# Patient Record
Sex: FEMALE | Race: WHITE | NOT HISPANIC OR LATINO | Employment: FULL TIME | URBAN - NONMETROPOLITAN AREA
[De-identification: names, ages, dates, MRNs, and addresses within clinical notes are randomized per-mention and may not be internally consistent; named-entity substitution may affect disease eponyms.]

---

## 2023-04-11 ENCOUNTER — TELEPHONE (OUTPATIENT)
Dept: PRIMARY CARE | Facility: CLINIC | Age: 48
End: 2023-04-11
Payer: COMMERCIAL

## 2023-04-11 NOTE — TELEPHONE ENCOUNTER
REFILL NEEDED FOR BUSPIRONE SENT TO [facility]. SHE'S COMPLETELY OUT    THEN SHE WOULD LIKE TO HAVE A REQUEST SENT TO HER MAIL ORDER ([facility]) FOR GOING FORWARD.

## 2023-04-14 DIAGNOSIS — F32.9 MAJOR DEPRESSIVE DISORDER WITH CURRENT ACTIVE EPISODE, UNSPECIFIED DEPRESSION EPISODE SEVERITY, UNSPECIFIED WHETHER RECURRENT: ICD-10-CM

## 2023-04-14 RX ORDER — ACYCLOVIR 400 MG/1
400 TABLET ORAL 3 TIMES DAILY
COMMUNITY
Start: 2020-12-31 | End: 2024-05-31 | Stop reason: SDUPTHER

## 2023-04-14 RX ORDER — CITALOPRAM 40 MG/1
1 TABLET, FILM COATED ORAL DAILY
COMMUNITY
End: 2023-04-17 | Stop reason: SDUPTHER

## 2023-04-14 RX ORDER — MECLIZINE HYDROCHLORIDE 25 MG/1
25 TABLET ORAL 3 TIMES DAILY PRN
COMMUNITY
Start: 2020-05-27

## 2023-04-14 RX ORDER — CLOPIDOGREL BISULFATE 75 MG/1
1 TABLET ORAL DAILY
COMMUNITY
Start: 2022-08-31 | End: 2024-05-31 | Stop reason: SDUPTHER

## 2023-04-14 RX ORDER — NITROGLYCERIN 0.4 MG/1
0.4 TABLET SUBLINGUAL EVERY 5 MIN PRN
COMMUNITY

## 2023-04-14 RX ORDER — FENOFIBRATE 48 MG/1
48 TABLET, FILM COATED ORAL DAILY
COMMUNITY
End: 2023-04-17 | Stop reason: SDUPTHER

## 2023-04-14 RX ORDER — INSULIN GLARGINE-YFGN 100 [IU]/ML
INJECTION, SOLUTION SUBCUTANEOUS
COMMUNITY
Start: 2021-08-05 | End: 2024-03-12 | Stop reason: SDUPTHER

## 2023-04-14 RX ORDER — FLUTICASONE PROPIONATE 50 MCG
1 SPRAY, SUSPENSION (ML) NASAL DAILY
COMMUNITY
End: 2024-05-31 | Stop reason: SDUPTHER

## 2023-04-14 RX ORDER — ATORVASTATIN CALCIUM 80 MG/1
1 TABLET, FILM COATED ORAL DAILY
COMMUNITY
End: 2023-04-17 | Stop reason: SDUPTHER

## 2023-04-14 RX ORDER — BUSPIRONE HYDROCHLORIDE 5 MG/1
5 TABLET ORAL 3 TIMES DAILY
COMMUNITY
Start: 2023-02-17 | End: 2023-04-14 | Stop reason: SDUPTHER

## 2023-04-14 RX ORDER — INSULIN LISPRO 100 [IU]/ML
INJECTION, SOLUTION INTRAVENOUS; SUBCUTANEOUS
COMMUNITY
End: 2024-05-31 | Stop reason: SDUPTHER

## 2023-04-14 RX ORDER — BUSPIRONE HYDROCHLORIDE 5 MG/1
5 TABLET ORAL 3 TIMES DAILY
Qty: 90 TABLET | Refills: 0 | Status: SHIPPED | OUTPATIENT
Start: 2023-04-14 | End: 2023-04-17 | Stop reason: SDUPTHER

## 2023-04-14 RX ORDER — GABAPENTIN 400 MG/1
400 CAPSULE ORAL
COMMUNITY
End: 2024-04-16 | Stop reason: SDUPTHER

## 2023-04-14 RX ORDER — FLASH GLUCOSE SENSOR
1 KIT MISCELLANEOUS
COMMUNITY
Start: 2023-02-11 | End: 2024-02-16 | Stop reason: SDUPTHER

## 2023-04-17 ENCOUNTER — OFFICE VISIT (OUTPATIENT)
Dept: PRIMARY CARE | Facility: CLINIC | Age: 48
End: 2023-04-17
Payer: COMMERCIAL

## 2023-04-17 VITALS
HEIGHT: 66 IN | BODY MASS INDEX: 24.75 KG/M2 | DIASTOLIC BLOOD PRESSURE: 78 MMHG | HEART RATE: 72 BPM | SYSTOLIC BLOOD PRESSURE: 130 MMHG | WEIGHT: 154 LBS

## 2023-04-17 DIAGNOSIS — E78.5 HYPERLIPIDEMIA, UNSPECIFIED HYPERLIPIDEMIA TYPE: ICD-10-CM

## 2023-04-17 DIAGNOSIS — N92.1 MENOMETRORRHAGIA: Primary | ICD-10-CM

## 2023-04-17 DIAGNOSIS — F32.9 MAJOR DEPRESSIVE DISORDER WITH CURRENT ACTIVE EPISODE, UNSPECIFIED DEPRESSION EPISODE SEVERITY, UNSPECIFIED WHETHER RECURRENT: ICD-10-CM

## 2023-04-17 LAB
ESTIMATED AVERAGE GLUCOSE FOR HBA1C: 200 MG/DL
HEMATOCRIT (%) IN BLOOD BY AUTOMATED COUNT: 39.3 % (ref 36–46)
HEMOGLOBIN (G/DL) IN BLOOD: 13.3 G/DL (ref 12–16)
HEMOGLOBIN A1C/HEMOGLOBIN TOTAL IN BLOOD: 8.6 %

## 2023-04-17 PROCEDURE — 1036F TOBACCO NON-USER: CPT | Performed by: STUDENT IN AN ORGANIZED HEALTH CARE EDUCATION/TRAINING PROGRAM

## 2023-04-17 PROCEDURE — 99214 OFFICE O/P EST MOD 30 MIN: CPT | Performed by: STUDENT IN AN ORGANIZED HEALTH CARE EDUCATION/TRAINING PROGRAM

## 2023-04-17 RX ORDER — ATORVASTATIN CALCIUM 80 MG/1
80 TABLET, FILM COATED ORAL DAILY
Qty: 90 TABLET | Refills: 3 | Status: SHIPPED | OUTPATIENT
Start: 2023-04-17 | End: 2024-05-31 | Stop reason: SDUPTHER

## 2023-04-17 RX ORDER — BUSPIRONE HYDROCHLORIDE 5 MG/1
5 TABLET ORAL 3 TIMES DAILY
Qty: 270 TABLET | Refills: 3 | Status: SHIPPED | OUTPATIENT
Start: 2023-04-17 | End: 2024-04-02

## 2023-04-17 RX ORDER — CITALOPRAM 40 MG/1
40 TABLET, FILM COATED ORAL DAILY
Qty: 90 TABLET | Refills: 3 | Status: SHIPPED | OUTPATIENT
Start: 2023-04-17 | End: 2024-03-25 | Stop reason: SDUPTHER

## 2023-04-17 RX ORDER — FENOFIBRATE 48 MG/1
48 TABLET, FILM COATED ORAL DAILY
Qty: 90 TABLET | Refills: 3 | Status: SHIPPED | OUTPATIENT
Start: 2023-04-17 | End: 2024-04-02

## 2023-04-17 ASSESSMENT — PATIENT HEALTH QUESTIONNAIRE - PHQ9
SUM OF ALL RESPONSES TO PHQ9 QUESTIONS 1 AND 2: 0
1. LITTLE INTEREST OR PLEASURE IN DOING THINGS: NOT AT ALL
2. FEELING DOWN, DEPRESSED OR HOPELESS: NOT AT ALL

## 2023-04-17 NOTE — PROGRESS NOTES
Subjective   Patient ID: Blanca Nye is a 47 y.o. female who presents for 2 month f/u med check .    HPI    Here for follow up.     Anxiety and depression better with Buspirone to Citalopram. No side-effects noticed. Continue current regimen.       Reports that her symptoms of hot flashes and mood swings have improved with the above treatment. However, she has been having irregular and excessive bleeding. Has hx of endometriosis per her report. She would like to establish care with gynecologist.   Plan: Menometrorrhagia - referral to gynecology is provided.    Recovering from stroke well.     Arms are 80% to her normal   Legs are almost to the baseline strength.   She has not seen a neurologist yet. Wondering if she should establish care. Encouraged her to establish care with a neurologist.  Plan: Continue current regimen for phoikysical therapy.     Currently on atorvastatin. Last Lipid panel with high Triglyceride levels.   Plan: Continue Atorvastatin. reassess now.     Diabetes mellitus: Patient's most recent A1c was 9. Discussed about management options. Patient has been trying to take her medications regularly. Noticing her blood glucose levels have been in normal ranges now. takes 48 units of Siemglee at night and humalog 10 units withmeals  Plan: continue current regimen for now. We will reassess her A1c now.      Review of Systems  ROS negative except discussed above in HPI.    Vitals:    04/17/23 0816   BP: 130/78   Pulse: 72     Objective   Physical Exam  Constitutional:       Appearance: Normal appearance.   Cardiovascular:      Rate and Rhythm: Normal rate and regular rhythm.   Pulmonary:      Effort: Pulmonary effort is normal.      Breath sounds: Normal breath sounds.   Musculoskeletal:      Cervical back: Normal range of motion and neck supple.   Neurological:      Mental Status: She is alert.           Assessment/Plan   Blanca was seen today for 2 month f/u med check .  Diagnoses and all orders for  this visit:  Menometrorrhagia (Primary)  -     Referral to Gynecology; Future  Hyperlipidemia, unspecified hyperlipidemia type  -     atorvastatin (Lipitor) 80 mg tablet; Take 1 tablet (80 mg) by mouth once daily.  -     fenofibrate (Tricor) 48 mg tablet; Take 1 tablet (48 mg) by mouth once daily.  Major depressive disorder with current active episode, unspecified depression episode severity, unspecified whether recurrent  -     citalopram (CeleXA) 40 mg tablet; Take 1 tablet (40 mg) by mouth once daily.  -     busPIRone (Buspar) 5 mg tablet; Take 1 tablet (5 mg) by mouth in the morning and 1 tablet (5 mg) in the evening and 1 tablet (5 mg) before bedtime.      Follow up in 6 months.      Gage Carrasquillo MD MPH

## 2023-06-22 DIAGNOSIS — E13.9 DIABETES MELLITUS OF OTHER TYPE WITHOUT COMPLICATION, UNSPECIFIED WHETHER LONG TERM INSULIN USE (MULTI): Primary | ICD-10-CM

## 2023-10-17 ENCOUNTER — APPOINTMENT (OUTPATIENT)
Dept: PRIMARY CARE | Facility: CLINIC | Age: 48
End: 2023-10-17
Payer: COMMERCIAL

## 2023-12-18 ENCOUNTER — OFFICE VISIT (OUTPATIENT)
Dept: PRIMARY CARE | Facility: CLINIC | Age: 48
End: 2023-12-18
Payer: COMMERCIAL

## 2023-12-18 VITALS
BODY MASS INDEX: 24.08 KG/M2 | SYSTOLIC BLOOD PRESSURE: 126 MMHG | DIASTOLIC BLOOD PRESSURE: 80 MMHG | WEIGHT: 149.8 LBS | HEIGHT: 66 IN | HEART RATE: 77 BPM

## 2023-12-18 DIAGNOSIS — L03.011 CELLULITIS OF FINGER OF RIGHT HAND: Primary | ICD-10-CM

## 2023-12-18 DIAGNOSIS — N76.0 ACUTE VAGINITIS: ICD-10-CM
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
## 2023-12-18 PROCEDURE — 99213 OFFICE O/P EST LOW 20 MIN: CPT | Performed by: NURSE PRACTITIONER

## 2023-12-18 PROCEDURE — 1036F TOBACCO NON-USER: CPT | Performed by: NURSE PRACTITIONER

## 2023-12-18 RX ORDER — FLUCONAZOLE 150 MG/1
150 TABLET ORAL ONCE
Qty: 1 TABLET | Refills: 0 | Status: SHIPPED | OUTPATIENT
Start: 2023-12-18 | End: 2023-12-18

## 2023-12-18 RX ORDER — DOXYCYCLINE 100 MG/1
100 CAPSULE ORAL 2 TIMES DAILY
Qty: 10 CAPSULE | Refills: 0 | Status: SHIPPED | OUTPATIENT
Start: 2023-12-18 | End: 2023-12-23

## 2023-12-18 ASSESSMENT — ENCOUNTER SYMPTOMS
FEVER: 0
DIARRHEA: 0
VOMITING: 0
NUMBNESS: 0
HEADACHES: 0
ACTIVITY CHANGE: 0
NAUSEA: 0
SHORTNESS OF BREATH: 0
DIZZINESS: 0
MYALGIAS: 1
WOUND: 1
APPETITE CHANGE: 0
COLOR CHANGE: 1

## 2023-12-18 NOTE — PROGRESS NOTES
"Subjective   Patient ID: Blanca Nye is a 48 y.o. female who presents for Animal Bite (Personal dog - happened Friday - right index finger).    Dog bite right hand index finger  Dog bite Friday, Saturday swelling, Sunday feeling hot with redness/painful  She reports she started taking some \"left over\" Augmentin, over the weekend  Patient was bit by her own dog, Shiatzu and yorky mix dog  Dog UTD on shots had rabies vaccine   States she is prone to yeast infection with antibiotic use, would like something to treat yeast infection          Review of Systems   Constitutional:  Negative for activity change, appetite change and fever.   Respiratory:  Negative for shortness of breath.    Cardiovascular:  Negative for chest pain.   Gastrointestinal:  Negative for diarrhea, nausea and vomiting.   Musculoskeletal:  Positive for myalgias.   Skin:  Positive for color change and wound.   Neurological:  Negative for dizziness, numbness and headaches.       Objective   /80 (Patient Position: Sitting)   Pulse 77   Ht 1.676 m (5' 6\")   Wt 67.9 kg (149 lb 12.8 oz)   BMI 24.18 kg/m²     Physical Exam  Vitals reviewed.   Constitutional:       General: She is not in acute distress.     Appearance: Normal appearance. She is not ill-appearing.   Cardiovascular:      Rate and Rhythm: Normal rate and regular rhythm.   Pulmonary:      Effort: Pulmonary effort is normal.   Musculoskeletal:      Right hand: Swelling and tenderness present.   Skin:     Findings: Wound present.      Comments: Single puncture to right 1st finger lateral side, surrounding tissue swollen with erythema, no warmth noted, no drainage noted   Neurological:      Mental Status: She is alert and oriented to person, place, and time.         Assessment/Plan   Diagnoses and all orders for this visit:  Cellulitis of finger of right hand  -     doxycycline (Vibramycin) 100 mg capsule; Take 1 capsule (100 mg) by mouth 2 times a day for 5 days. Take with at least 8 " ounces (large glass) of water, do not lie down for 30 minutes after  Monitor redness, if worsening pain, fever, or redness return to office.   Complete entire coarse of antibiotics.  Advised ONLY take antibiotics for what they are prescribed for.   Keep area clean with soap and water.   Acute vaginitis  -     fluconazole (Diflucan) 150 mg tablet; Take 1 tablet (150 mg) by mouth 1 time for 1 dose.  Dog bite, initial encounter  Follow up as needed

## 2024-02-15 ENCOUNTER — TELEPHONE (OUTPATIENT)
Dept: PRIMARY CARE | Facility: CLINIC | Age: 49
End: 2024-02-15
Payer: COMMERCIAL

## 2024-02-15 DIAGNOSIS — E13.9 DIABETES 1.5, MANAGED AS TYPE 2 (MULTI): ICD-10-CM

## 2024-02-16 RX ORDER — FLASH GLUCOSE SENSOR
1 KIT MISCELLANEOUS
Qty: 1 EACH | Refills: 3 | Status: SHIPPED | OUTPATIENT
Start: 2024-02-16 | End: 2024-05-09 | Stop reason: SDUPTHER

## 2024-03-12 ENCOUNTER — TELEPHONE (OUTPATIENT)
Dept: PRIMARY CARE | Facility: CLINIC | Age: 49
End: 2024-03-12
Payer: COMMERCIAL

## 2024-03-12 DIAGNOSIS — E13.9 DIABETES 1.5, MANAGED AS TYPE 2 (MULTI): ICD-10-CM

## 2024-03-12 RX ORDER — INSULIN GLARGINE-YFGN 100 [IU]/ML
INJECTION, SOLUTION SUBCUTANEOUS
Qty: 135 ML | Refills: 3 | Status: SHIPPED | OUTPATIENT
Start: 2024-03-12 | End: 2024-05-31 | Stop reason: SDUPTHER

## 2024-03-25 ENCOUNTER — TELEPHONE (OUTPATIENT)
Dept: PRIMARY CARE | Facility: CLINIC | Age: 49
End: 2024-03-25
Payer: COMMERCIAL

## 2024-03-25 DIAGNOSIS — F32.9 MAJOR DEPRESSIVE DISORDER WITH CURRENT ACTIVE EPISODE, UNSPECIFIED DEPRESSION EPISODE SEVERITY, UNSPECIFIED WHETHER RECURRENT: ICD-10-CM

## 2024-03-25 RX ORDER — CITALOPRAM 40 MG/1
40 TABLET, FILM COATED ORAL DAILY
Qty: 90 TABLET | Refills: 3 | Status: SHIPPED | OUTPATIENT
Start: 2024-03-25 | End: 2024-05-31 | Stop reason: SDUPTHER

## 2024-03-25 NOTE — TELEPHONE ENCOUNTER
citalopram (CeleXA) 40 mg tablet [[id number]]    Order Details  Dose: 40 mg Route: oral Frequency: Daily   Dispense Quantity: 90 tablet Refills: 3          Sig: Take 1 tablet (40 mg) by mouth once daily.         Start Date: 04/17/23 End Date: 04/16/24 after 365 doses   Written Date: 04/17/23 Rx Expiration Date: 04/16/24        Associated Diagnoses: Major depressive disorder with current active episode, unspecified depression episode severity, unspecified whether recurrent [F32.9]   [facility]. THANK YOU.

## 2024-04-01 DIAGNOSIS — F32.9 MAJOR DEPRESSIVE DISORDER WITH CURRENT ACTIVE EPISODE, UNSPECIFIED DEPRESSION EPISODE SEVERITY, UNSPECIFIED WHETHER RECURRENT: ICD-10-CM

## 2024-04-01 DIAGNOSIS — E78.5 HYPERLIPIDEMIA, UNSPECIFIED HYPERLIPIDEMIA TYPE: ICD-10-CM

## 2024-04-02 RX ORDER — BUSPIRONE HYDROCHLORIDE 5 MG/1
5 TABLET ORAL 3 TIMES DAILY
Qty: 270 TABLET | Refills: 3 | Status: SHIPPED | OUTPATIENT
Start: 2024-04-02 | End: 2024-05-31 | Stop reason: SDUPTHER

## 2024-04-02 RX ORDER — FENOFIBRATE 48 MG/1
48 TABLET, FILM COATED ORAL DAILY
Qty: 90 TABLET | Refills: 3 | Status: SHIPPED | OUTPATIENT
Start: 2024-04-02 | End: 2024-05-31 | Stop reason: SDUPTHER

## 2024-04-16 ENCOUNTER — TELEPHONE (OUTPATIENT)
Dept: PRIMARY CARE | Facility: CLINIC | Age: 49
End: 2024-04-16
Payer: COMMERCIAL

## 2024-04-16 DIAGNOSIS — E10.42 DM TYPE 1 WITH DIABETIC PERIPHERAL NEUROPATHY (MULTI): ICD-10-CM

## 2024-04-16 RX ORDER — GABAPENTIN 400 MG/1
400 CAPSULE ORAL SEE ADMIN INSTRUCTIONS
Qty: 120 CAPSULE | Refills: 0 | Status: SHIPPED | OUTPATIENT
Start: 2024-04-16 | End: 2024-05-31 | Stop reason: SDUPTHER

## 2024-04-18 DIAGNOSIS — E13.9 DIABETES 1.5, MANAGED AS TYPE 2 (MULTI): ICD-10-CM

## 2024-04-18 RX ORDER — FLASH GLUCOSE SENSOR
KIT MISCELLANEOUS
Qty: 1 EACH | Refills: 25 | OUTPATIENT
Start: 2024-04-18

## 2024-05-08 ENCOUNTER — LAB (OUTPATIENT)
Dept: LAB | Facility: LAB | Age: 49
End: 2024-05-08
Payer: COMMERCIAL

## 2024-05-08 DIAGNOSIS — E13.9 DIABETES MELLITUS OF OTHER TYPE WITHOUT COMPLICATION, UNSPECIFIED WHETHER LONG TERM INSULIN USE (MULTI): ICD-10-CM

## 2024-05-08 LAB
EST. AVERAGE GLUCOSE BLD GHB EST-MCNC: 194 MG/DL
HBA1C MFR BLD: 8.4 %

## 2024-05-08 PROCEDURE — 36415 COLL VENOUS BLD VENIPUNCTURE: CPT

## 2024-05-08 PROCEDURE — 83036 HEMOGLOBIN GLYCOSYLATED A1C: CPT

## 2024-05-09 ENCOUNTER — TELEPHONE (OUTPATIENT)
Dept: PRIMARY CARE | Facility: CLINIC | Age: 49
End: 2024-05-09
Payer: COMMERCIAL

## 2024-05-09 DIAGNOSIS — E13.9 DIABETES 1.5, MANAGED AS TYPE 2 (MULTI): ICD-10-CM

## 2024-05-09 RX ORDER — FLASH GLUCOSE SENSOR
1 KIT MISCELLANEOUS
Qty: 1 EACH | Refills: 3 | Status: SHIPPED | OUTPATIENT
Start: 2024-05-09 | End: 2024-05-31 | Stop reason: SDUPTHER

## 2024-05-31 ENCOUNTER — OFFICE VISIT (OUTPATIENT)
Dept: PRIMARY CARE | Facility: CLINIC | Age: 49
End: 2024-05-31
Payer: COMMERCIAL

## 2024-05-31 VITALS
SYSTOLIC BLOOD PRESSURE: 146 MMHG | WEIGHT: 147.7 LBS | HEART RATE: 74 BPM | OXYGEN SATURATION: 99 % | BODY MASS INDEX: 23.74 KG/M2 | DIASTOLIC BLOOD PRESSURE: 80 MMHG | HEIGHT: 66 IN

## 2024-05-31 DIAGNOSIS — E13.9 DIABETES 1.5, MANAGED AS TYPE 2 (MULTI): ICD-10-CM

## 2024-05-31 DIAGNOSIS — F32.9 MAJOR DEPRESSIVE DISORDER WITH CURRENT ACTIVE EPISODE, UNSPECIFIED DEPRESSION EPISODE SEVERITY, UNSPECIFIED WHETHER RECURRENT: ICD-10-CM

## 2024-05-31 DIAGNOSIS — B00.1 COLD SORE: ICD-10-CM

## 2024-05-31 DIAGNOSIS — E10.42 DM TYPE 1 WITH DIABETIC PERIPHERAL NEUROPATHY (MULTI): ICD-10-CM

## 2024-05-31 DIAGNOSIS — I25.10 ATHEROSCLEROSIS OF NATIVE CORONARY ARTERY, UNSPECIFIED WHETHER ANGINA PRESENT, UNSPECIFIED WHETHER NATIVE OR TRANSPLANTED HEART: ICD-10-CM

## 2024-05-31 DIAGNOSIS — E78.5 HYPERLIPIDEMIA, UNSPECIFIED HYPERLIPIDEMIA TYPE: ICD-10-CM

## 2024-05-31 DIAGNOSIS — Z00.00 ROUTINE GENERAL MEDICAL EXAMINATION AT A HEALTH CARE FACILITY: Primary | ICD-10-CM

## 2024-05-31 DIAGNOSIS — K21.9 GASTROESOPHAGEAL REFLUX DISEASE, UNSPECIFIED WHETHER ESOPHAGITIS PRESENT: ICD-10-CM

## 2024-05-31 DIAGNOSIS — J30.9 ALLERGIC RHINITIS, UNSPECIFIED SEASONALITY, UNSPECIFIED TRIGGER: ICD-10-CM

## 2024-05-31 PROCEDURE — 99396 PREV VISIT EST AGE 40-64: CPT | Performed by: STUDENT IN AN ORGANIZED HEALTH CARE EDUCATION/TRAINING PROGRAM

## 2024-05-31 PROCEDURE — 3077F SYST BP >= 140 MM HG: CPT | Performed by: STUDENT IN AN ORGANIZED HEALTH CARE EDUCATION/TRAINING PROGRAM

## 2024-05-31 PROCEDURE — 3079F DIAST BP 80-89 MM HG: CPT | Performed by: STUDENT IN AN ORGANIZED HEALTH CARE EDUCATION/TRAINING PROGRAM

## 2024-05-31 PROCEDURE — 1036F TOBACCO NON-USER: CPT | Performed by: STUDENT IN AN ORGANIZED HEALTH CARE EDUCATION/TRAINING PROGRAM

## 2024-05-31 PROCEDURE — 3052F HG A1C>EQUAL 8.0%<EQUAL 9.0%: CPT | Performed by: STUDENT IN AN ORGANIZED HEALTH CARE EDUCATION/TRAINING PROGRAM

## 2024-05-31 RX ORDER — PANTOPRAZOLE SODIUM 40 MG/1
40 TABLET, DELAYED RELEASE ORAL DAILY
Qty: 30 TABLET | Refills: 1 | Status: SHIPPED | OUTPATIENT
Start: 2024-05-31 | End: 2024-07-30

## 2024-05-31 RX ORDER — INSULIN GLARGINE-YFGN 100 [IU]/ML
INJECTION, SOLUTION SUBCUTANEOUS
Qty: 135 ML | Refills: 3 | Status: SHIPPED | OUTPATIENT
Start: 2024-05-31 | End: 2024-06-10 | Stop reason: SDUPTHER

## 2024-05-31 RX ORDER — CITALOPRAM 40 MG/1
40 TABLET, FILM COATED ORAL DAILY
Qty: 90 TABLET | Refills: 3 | Status: SHIPPED | OUTPATIENT
Start: 2024-05-31 | End: 2025-05-31

## 2024-05-31 RX ORDER — INSULIN LISPRO 100 [IU]/ML
INJECTION, SOLUTION INTRAVENOUS; SUBCUTANEOUS
Qty: 15 ML | Refills: 11 | Status: SHIPPED | OUTPATIENT
Start: 2024-05-31

## 2024-05-31 RX ORDER — CLOPIDOGREL BISULFATE 75 MG/1
75 TABLET ORAL DAILY
Qty: 90 TABLET | Refills: 3 | Status: SHIPPED | OUTPATIENT
Start: 2024-05-31 | End: 2025-05-31

## 2024-05-31 RX ORDER — GABAPENTIN 400 MG/1
400 CAPSULE ORAL SEE ADMIN INSTRUCTIONS
Qty: 370 CAPSULE | Refills: 3 | Status: SHIPPED | OUTPATIENT
Start: 2024-05-31 | End: 2025-05-31

## 2024-05-31 RX ORDER — ACYCLOVIR 400 MG/1
400 TABLET ORAL 3 TIMES DAILY
Qty: 30 TABLET | Refills: 3 | Status: SHIPPED | OUTPATIENT
Start: 2024-05-31 | End: 2024-06-10 | Stop reason: SDUPTHER

## 2024-05-31 RX ORDER — FENOFIBRATE 48 MG/1
48 TABLET, FILM COATED ORAL DAILY
Qty: 90 TABLET | Refills: 3 | Status: SHIPPED | OUTPATIENT
Start: 2024-05-31

## 2024-05-31 RX ORDER — BUSPIRONE HYDROCHLORIDE 5 MG/1
5 TABLET ORAL 3 TIMES DAILY
Qty: 270 TABLET | Refills: 3 | Status: SHIPPED | OUTPATIENT
Start: 2024-05-31

## 2024-05-31 RX ORDER — FLASH GLUCOSE SENSOR
1 KIT MISCELLANEOUS
Qty: 6 EACH | Refills: 3 | Status: SHIPPED | OUTPATIENT
Start: 2024-05-31

## 2024-05-31 RX ORDER — ATORVASTATIN CALCIUM 80 MG/1
80 TABLET, FILM COATED ORAL DAILY
Qty: 90 TABLET | Refills: 3 | Status: SHIPPED | OUTPATIENT
Start: 2024-05-31 | End: 2025-05-31

## 2024-05-31 RX ORDER — FLUTICASONE PROPIONATE 50 MCG
1 SPRAY, SUSPENSION (ML) NASAL DAILY
Qty: 16 G | Refills: 3 | Status: SHIPPED | OUTPATIENT
Start: 2024-05-31 | End: 2025-05-31

## 2024-05-31 ASSESSMENT — PATIENT HEALTH QUESTIONNAIRE - PHQ9
2. FEELING DOWN, DEPRESSED OR HOPELESS: NOT AT ALL
1. LITTLE INTEREST OR PLEASURE IN DOING THINGS: NOT AT ALL
SUM OF ALL RESPONSES TO PHQ9 QUESTIONS 1 AND 2: 0

## 2024-05-31 NOTE — PROGRESS NOTES
Subjective   Patient ID: Blanca Nye is a 48 y.o. female who presents for med check (PT is here today for a med check. Needs Colonoscopy and mammo ordered. ).    HPI    Here for follow up.     Anxiety and depression better with Buspirone to Citalopram. No side-effects noticed. Continue current regimen.     Hx of stroke.  Arms are 80% to her normal   Legs are almost to the baseline strength.   She has not seen a neurologist yet. Wondering if she should establish care. Encouraged her to establish care with a neurologist.  Plan: Continue current regimen for phoikysical therapy.     Currently on atorvastatin. Last Lipid panel with high Triglyceride levels.   Plan: Continue Atorvastatin. reassess now.     Diabetes mellitus: Patient's most recent A1c was 8.4. Discussed about management options. Patient has been trying to take her medications regularly. Noticing her blood glucose levels have been in normal ranges now. takes 50 units of Siemglee at night and humalog 10 units with meals.  Reports that she has been more careful with her diet lately.  Her vision is diminishing and diabetes is thought to be the cause.  So she is being extra careful to control her sugars well.  Plan: continue current regimen for now. We will reassess her A1c now.    Review of Systems  ROS negative except discussed above in HPI.    Vitals:    05/31/24 1106   BP: 146/80   Pulse: 74   SpO2: 99%     Objective   Physical Exam  Constitutional:       Appearance: Normal appearance.   Cardiovascular:      Rate and Rhythm: Normal rate and regular rhythm.      Pulses: Normal pulses.      Heart sounds: Normal heart sounds.   Pulmonary:      Effort: Pulmonary effort is normal.      Breath sounds: Normal breath sounds.   Neurological:      Mental Status: She is alert.           Assessment/Plan   Blanca was seen today for med check.  Diagnoses and all orders for this visit:  Routine general medical examination at a health care facility (Primary)  Diabetes 1.5,  managed as type 2 (Multi)  -     flash glucose sensor kit (FreeStyle Danisha 14 Day Sensor) kit; Inject 1 each under the skin every 14 (fourteen) days.  -     insulin glargine-yfgn (Semglee,insulin glarg-yfgn,Pen) 100 unit/mL (3 mL) Pen; Inject 50 units under the skin once daily  -     HumaLOG KwikPen Insulin 100 unit/mL injection; Inject 10-15 units sq with each meal. Pls alternate inj site. Max of 45 units per day.  Hyperlipidemia, unspecified hyperlipidemia type  -     atorvastatin (Lipitor) 80 mg tablet; Take 1 tablet (80 mg) by mouth once daily.  -     fenofibrate (Tricor) 48 mg tablet; Take 1 tablet (48 mg) by mouth once daily.  -     Lipid Panel; Future  Major depressive disorder with current active episode, unspecified depression episode severity, unspecified whether recurrent  -     busPIRone (Buspar) 5 mg tablet; Take 1 tablet (5 mg) by mouth 3 times a day.  -     citalopram (CeleXA) 40 mg tablet; Take 1 tablet (40 mg) by mouth once daily.  DM type 1 with diabetic peripheral neuropathy (Multi)  -     gabapentin (Neurontin) 400 mg capsule; Take 1 capsule (400 mg) by mouth see administration instructions. TAKE 1 CAPSULE in morning and afternoon, 2 capsules at bedtime= total 4 per day  -     Hemoglobin A1C; Future  -     Lipid Panel; Future  -     Albumin, urine, random; Future  Atherosclerosis of native coronary artery, unspecified whether angina present, unspecified whether native or transplanted heart  -     clopidogrel (Plavix) 75 mg tablet; Take 1 tablet (75 mg) by mouth once daily.  Cold sore  -     acyclovir (Zovirax) 400 mg tablet; Take 1 tablet (400 mg) by mouth 3 times a day.  Allergic rhinitis, unspecified seasonality, unspecified trigger  -     fluticasone (Flonase) 50 mcg/actuation nasal spray; Administer 1 spray into each nostril once daily.  Gastroesophageal reflux disease, unspecified whether esophagitis present  -     pantoprazole (ProtoNix) 40 mg EC tablet; Take 1 tablet (40 mg) by mouth once  daily. Do not crush, chew, or split.      Follow up in 3 months with fasting labs.          Gage Carrasquillo MD MPH

## 2024-06-06 ENCOUNTER — TELEPHONE (OUTPATIENT)
Dept: PRIMARY CARE | Facility: CLINIC | Age: 49
End: 2024-06-06
Payer: COMMERCIAL

## 2024-06-06 DIAGNOSIS — K21.9 GASTROESOPHAGEAL REFLUX DISEASE, UNSPECIFIED WHETHER ESOPHAGITIS PRESENT: ICD-10-CM

## 2024-06-06 DIAGNOSIS — B00.1 COLD SORE: ICD-10-CM

## 2024-06-06 DIAGNOSIS — E10.42 DM TYPE 1 WITH DIABETIC PERIPHERAL NEUROPATHY (MULTI): ICD-10-CM

## 2024-06-06 DIAGNOSIS — E13.9 DIABETES 1.5, MANAGED AS TYPE 2 (MULTI): ICD-10-CM

## 2024-06-06 NOTE — TELEPHONE ENCOUNTER
Needing 3 month supplies of acyclovir, humalog, pantoprazole and gabapentin sent to [facility]. Her insurance won't cover them if it's not 90 supplies. (These were recently sent her pharmacy, but they told her she needs the 90 supplies)

## 2024-06-10 RX ORDER — ACYCLOVIR 400 MG/1
400 TABLET ORAL 3 TIMES DAILY
Qty: 30 TABLET | Refills: 3 | Status: SHIPPED | OUTPATIENT
Start: 2024-06-10

## 2024-06-10 RX ORDER — INSULIN GLARGINE-YFGN 100 [IU]/ML
INJECTION, SOLUTION SUBCUTANEOUS
Qty: 135 ML | Refills: 3 | Status: SHIPPED | OUTPATIENT
Start: 2024-06-10

## 2024-06-13 ENCOUNTER — TELEPHONE (OUTPATIENT)
Dept: PRIMARY CARE | Facility: CLINIC | Age: 49
End: 2024-06-13
Payer: COMMERCIAL

## 2024-06-13 DIAGNOSIS — E10.42 DM TYPE 1 WITH DIABETIC PERIPHERAL NEUROPATHY (MULTI): ICD-10-CM

## 2024-06-13 NOTE — TELEPHONE ENCOUNTER
Patient called in and stated that her insurance company isnt covering her gabapentin since its over 1200mg a day. She was wondering if you could send in a script for the 3 pills daily instead of 4 that way she can stay under the 1200mg daily.    Please advise

## 2024-06-14 DIAGNOSIS — E10.42 DM TYPE 1 WITH DIABETIC PERIPHERAL NEUROPATHY (MULTI): ICD-10-CM

## 2024-06-14 RX ORDER — GABAPENTIN 300 MG/1
300 CAPSULE ORAL SEE ADMIN INSTRUCTIONS
Qty: 370 CAPSULE | Refills: 3 | Status: SHIPPED | OUTPATIENT
Start: 2024-06-14 | End: 2024-06-14 | Stop reason: SDUPTHER

## 2024-06-14 RX ORDER — GABAPENTIN 300 MG/1
300 CAPSULE ORAL SEE ADMIN INSTRUCTIONS
Qty: 370 CAPSULE | Refills: 3 | Status: SHIPPED | OUTPATIENT
Start: 2024-06-14 | End: 2025-06-14

## 2024-06-20 ENCOUNTER — TELEPHONE (OUTPATIENT)
Dept: PRIMARY CARE | Facility: CLINIC | Age: 49
End: 2024-06-20
Payer: COMMERCIAL

## 2024-06-20 NOTE — TELEPHONE ENCOUNTER
Patient called in and was wondering if she could have a refill on her gabapentin 400mg called into [facility] pharmacy for a 30 day supply.    Thank you

## 2024-06-21 ENCOUNTER — TELEPHONE (OUTPATIENT)
Dept: PRIMARY CARE | Facility: CLINIC | Age: 49
End: 2024-06-21
Payer: COMMERCIAL

## 2024-06-21 NOTE — TELEPHONE ENCOUNTER
Spoke to pt and let her know that RX was approved from [facility]. Expressed an understanding and nothing else needed at this time.

## 2024-06-24 ENCOUNTER — TELEPHONE (OUTPATIENT)
Dept: PRIMARY CARE | Facility: CLINIC | Age: 49
End: 2024-06-24
Payer: COMMERCIAL

## 2024-06-24 NOTE — TELEPHONE ENCOUNTER
# [id number]  Two scripts for Gabapentin in differing strenghts with same instructions, please call back to clarify which strength & invoice# [id number]

## 2024-06-24 NOTE — TELEPHONE ENCOUNTER
Spoke to Beatriz and Dr. Carrasquillo and he wanted the 300 mg sent out due to insurance purposes. Pharmacist Beatriz confirmed that the 300mg was sent to pt on 06/21/24 and she should be receiving this any day if she had not already.

## 2024-06-25 ENCOUNTER — TELEPHONE (OUTPATIENT)
Dept: PRIMARY CARE | Facility: CLINIC | Age: 49
End: 2024-06-25
Payer: COMMERCIAL

## 2024-06-25 DIAGNOSIS — E10.42 DM TYPE 1 WITH DIABETIC PERIPHERAL NEUROPATHY (MULTI): ICD-10-CM

## 2024-06-25 RX ORDER — GABAPENTIN 300 MG/1
300 CAPSULE ORAL SEE ADMIN INSTRUCTIONS
Qty: 8 CAPSULE | Refills: 0 | Status: SHIPPED | OUTPATIENT
Start: 2024-06-25 | End: 2025-06-25

## 2024-06-25 NOTE — TELEPHONE ENCOUNTER
Refill req for Gabapentin 400mg to [facility] just enough to get her through till her [facility] goes through

## 2024-07-24 ENCOUNTER — TELEPHONE (OUTPATIENT)
Dept: PRIMARY CARE | Facility: CLINIC | Age: 49
End: 2024-07-24
Payer: COMMERCIAL

## 2024-07-24 NOTE — TELEPHONE ENCOUNTER
Would like the Vonnie 2 sensor, instead of the al she is getting now  Pharmacy    [facility] - [place], [place] - [address] [place] [id number]  Phone: [phone]  Fax: [phone]

## 2024-08-13 ENCOUNTER — OFFICE VISIT (OUTPATIENT)
Dept: URGENT CARE | Facility: CLINIC | Age: 49
End: 2024-08-13
Payer: COMMERCIAL

## 2024-08-13 ENCOUNTER — HOSPITAL ENCOUNTER (OUTPATIENT)
Dept: RADIOLOGY | Facility: HOSPITAL | Age: 49
Discharge: HOME | End: 2024-08-13
Payer: COMMERCIAL

## 2024-08-13 VITALS
RESPIRATION RATE: 18 BRPM | SYSTOLIC BLOOD PRESSURE: 182 MMHG | BODY MASS INDEX: 24.11 KG/M2 | WEIGHT: 150 LBS | TEMPERATURE: 98 F | HEART RATE: 72 BPM | HEIGHT: 66 IN | DIASTOLIC BLOOD PRESSURE: 77 MMHG | OXYGEN SATURATION: 100 %

## 2024-08-13 DIAGNOSIS — M79.672 ACUTE FOOT PAIN, LEFT: Primary | ICD-10-CM

## 2024-08-13 DIAGNOSIS — Z86.69 H/O PERIPHERAL NEUROPATHY: ICD-10-CM

## 2024-08-13 DIAGNOSIS — R03.0 ELEVATED BLOOD PRESSURE READING: ICD-10-CM

## 2024-08-13 DIAGNOSIS — M79.672 ACUTE FOOT PAIN, LEFT: ICD-10-CM

## 2024-08-13 PROCEDURE — 99212 OFFICE O/P EST SF 10 MIN: CPT | Performed by: PHYSICIAN ASSISTANT

## 2024-08-13 PROCEDURE — 73630 X-RAY EXAM OF FOOT: CPT | Mod: LEFT SIDE | Performed by: RADIOLOGY

## 2024-08-13 PROCEDURE — 73630 X-RAY EXAM OF FOOT: CPT | Mod: LT

## 2024-08-13 NOTE — PROGRESS NOTES
[facility]   MARQUITA NOTE:      Name: Blanca Nye, 49 y.o.    CSN:[id number]   MRN:[id number]    PCP: Gage Carrasquillo MD MPH    ALL:    Allergies   Allergen Reactions    Metoprolol Other     Dizziness    Iodine Rash    Povidone-Iodine Rash       History:    Chief Complaint: Foot Pain (Foot pain and discoloration X 2 weeks, injury of impact from a hair dryer and excessive walking )    Encounter Date: 8/13/2024      HPI: The history was obtained from the patient. Blanca is a 49 y.o. female, who presents with a chief complaint of Foot Pain (Foot pain and discoloration X 2 weeks, injury of impact from a hair dryer and excessive walking ) the pain is tolerable, but worse with forefoot WB, she does have a h/o peripheral neuropathy & is concerned she might have broken something in her foot.     PMHx:    No past medical history on file.           Current Outpatient Medications   Medication Sig Dispense Refill    atorvastatin (Lipitor) 80 mg tablet Take 1 tablet (80 mg) by mouth once daily. 90 tablet 3    busPIRone (Buspar) 5 mg tablet Take 1 tablet (5 mg) by mouth 3 times a day. 270 tablet 3    citalopram (CeleXA) 40 mg tablet Take 1 tablet (40 mg) by mouth once daily. 90 tablet 3    clopidogrel (Plavix) 75 mg tablet Take 1 tablet (75 mg) by mouth once daily. 90 tablet 3    fenofibrate (Tricor) 48 mg tablet Take 1 tablet (48 mg) by mouth once daily. 90 tablet 3    flash glucose sensor kit (FreeStyle Danisha 14 Day Sensor) kit Inject 1 each under the skin every 14 (fourteen) days. 6 each 3    fluticasone (Flonase) 50 mcg/actuation nasal spray Administer 1 spray into each nostril once daily. 16 g 3    gabapentin (Neurontin) 300 mg capsule Take 1 capsule (300 mg) by mouth see administration instructions. TAKE 1 CAPSULE in morning and afternoon, 2 capsules at bedtime= total 4 per day 8 capsule 0    HumaLOG KwikPen Insulin 100 unit/mL injection Inject 10-15 units sq with each meal. Pls alternate inj  site. Max of 45 units per day. 15 mL 11    insulin glargine-yfgn (Semglee,insulin glarg-yfgn,Pen) 100 unit/mL (3 mL) Pen Inject 50 units under the skin once daily 135 mL 3    meclizine (Antivert) 25 mg tablet Take 1 tablet (25 mg) by mouth 3 times a day as needed for dizziness.      nitroglycerin (Nitrostat) 0.4 mg SL tablet Place 1 tablet (0.4 mg) under the tongue every 5 minutes if needed for chest pain.      acyclovir (Zovirax) 400 mg tablet Take 1 tablet (400 mg) by mouth 3 times a day. (Patient not taking: Reported on 8/13/2024) 30 tablet 3    pantoprazole (ProtoNix) 40 mg EC tablet Take 1 tablet (40 mg) by mouth once daily. Do not crush, chew, or split. 30 tablet 1     No current facility-administered medications for this visit.         PMSx:    Past Surgical History:   Procedure Laterality Date    CT ANGIO NECK  9/2/2022    CT NECK ANGIO W AND WO IV CONTRAST 9/2/2022 [facility] CLINICAL LEGACY    CT HEAD ANGIO W AND WO IV CONTRAST  9/2/2022    CT HEAD ANGIO W AND WO IV CONTRAST 9/2/2022 [facility] CLINICAL LEGACY    OTHER SURGICAL HISTORY  10/29/2019    Loop electrosurgical excision procedure    OTHER SURGICAL HISTORY  08/25/2022    Cardiac catheterization with stent placement       Fam Hx:   Family History   Problem Relation Name Age of Onset    Heart attack Father         SOC. Hx:     Social History     Socioeconomic History    Marital status:      Spouse name: Not on file    Number of children: Not on file    Years of education: Not on file    Highest education level: Not on file   Occupational History    Not on file   Tobacco Use    Smoking status: Former     Types: Cigarettes    Smokeless tobacco: Never   Vaping Use    Vaping status: Never Used   Substance and Sexual Activity    Alcohol use: Not Currently    Drug use: Never    Sexual activity: Not on file   Other Topics Concern    Not on file   Social History Narrative    Not on file     Social Determinants of Health     Financial Resource Strain: Not on file    Food Insecurity: Not on file   Transportation Needs: Not on file   Physical Activity: Not on file   Stress: Not on file   Social Connections: Not on file   Intimate Partner Violence: Not on file   Housing Stability: Not on file         Vitals:    08/13/24 1726   BP: (!) 182/77   Pulse: 72   Resp: 18   Temp: 36.7 °C (98 °F)   SpO2: 100%     68 kg (150 lb)          Physical Exam  Vitals reviewed.   Constitutional:       Appearance: She is normal weight.   HENT:      Head: Normocephalic and atraumatic.   Eyes:      Extraocular Movements: Extraocular movements intact.      Pupils: Pupils are equal, round, and reactive to light.   Cardiovascular:      Pulses: Normal pulses.   Pulmonary:      Effort: Pulmonary effort is normal.   Musculoskeletal:        Feet:    Skin:     General: Skin is warm.   Neurological:      Mental Status: She is alert.         LABORATORY @ RADIOLOGICAL IMAGING (if done):     ORDERING CLINICIAN:  NORMAN DELANEY      FINDINGS:  Attention to the 5th metatarsal head and metatarsophalangeal joint  demonstrates no specific abnormality.      No evidence of left foot fracture. Mild soft tissue swelling.      IMPRESSION:  Mild left foot soft tissue swelling. No evidence of fracture.      Signed by: Sami Pritchard 8/13/2024 6:44 PM  Dictation workstation:   [id number]    reviewed    ____________________________________________________________________    I did personally review Blanca's past medical history, surgical history, social history, as well as family history (when relevant).  In this case, I also oversaw the her drug management by reviewing her medication list, allergy list, as well as the medications that I prescribed during the UC course and/or recommended as an out-patient (including possible OTC medications such as acetaminophen, NSAIDs , etc).    After reviewing the items above, I did look at previous medical documentation, such as recent hospitalizations, office visits, and/or recent  consultations with PCP/specialist.                          SDOH:   Another factor that I considered in Blanca's care was her Social Determinants of Health (SDOH). During this  encounter, she did not have social determinants of health. Those SDOH influencing Blanca's care are: none      _____________________________________________________________________       COURSE/MEDICAL DECISION MAKING:    Blanca is a 49 y.o., who presents with a working diagnosis of   1. Acute foot pain, left    2. H/O peripheral neuropathy    3. Elevated blood pressure reading     with a differential to include: Sprain, strain, contusion, fracture, avulsion fracture, dislocation, tendinitis, tenosynovitis    No acute fracture, images reviewed with patient, short walking boot provided x1.5 weeks, request supportive care with ice, ROM AT   3.   She had an elevated reading and is diabetic, I've reviewed her past BP recordings, they've been <140/90 and she's encouraged to record them at home until her next GP visit.        Fish Self PA-C   Advanced Practice Provider  [facility]

## 2024-08-15 NOTE — PROGRESS NOTES
[facility]  Cardiology Clinic Visit Note    This is a 49 y.o. female former smoker with a history of CAD, poorly controlled DM, and HLD who presents to the clinic for follow up.     She presented to the  Urgent Care center here in [place] on 8/13/2024 with right foot pain. It was noted that her blood pressure to markedly elevated reading 182/77. She is not on any blood pressure treatment.     Today she endorses occasional chest tightness that occurs at random but not associated with any physical exertion or relieved by rest.  She has had 3-4 instances of chest pressure in the past year and has not taken any sublingual nitroglycerin. She denies shortness of breath, palpitations, leg edema, fever, chills, orthopnea, paroxysmal nocturnal dyspnea or syncope.       Active Issues  Coronary Artery Disease  Hyperlipidemia  -S/P PCI to prox LAD for ISR and progressive native artery disease, treated with ORION x1 on 8/31/2022. Previous PCI in 2008.   -Echo 8/2022 showed mildly reduced LV systolic function with LVEF 45%  -GDMT includes Plavix, atorvastatin 80 mg and fenofibrate. Jardiance 10 mg was added at her last cardiology visit.  -EKG in the office today shows NSR with no acute or chronic ischemic changes.     Hypertension  -Blood pressure in the office today 130/72.  -She does not have a cuff at home    Past Medical History  History reviewed. No pertinent past medical history.    Past Surgical History  Past Surgical History:   Procedure Laterality Date    CT ANGIO NECK  9/2/2022    CT NECK ANGIO W AND WO IV CONTRAST 9/2/2022 [facility]    CT HEAD ANGIO W AND WO IV CONTRAST  9/2/2022    CT HEAD ANGIO W AND WO IV CONTRAST 9/2/2022 [facility]ICAL [facility]    OTHER SURGICAL HISTORY  10/29/2019    Loop electrosurgical excision procedure    OTHER SURGICAL HISTORY  08/25/2022    Cardiac catheterization with stent placement       Medications  Current Outpatient Medications   Medication Instructions     acyclovir (ZOVIRAX) 400 mg, oral, 3 times daily    atorvastatin (LIPITOR) 80 mg, oral, Daily    busPIRone (BUSPAR) 5 mg, oral, 3 times daily    citalopram (CELEXA) 40 mg, oral, Daily    clopidogrel (PLAVIX) 75 mg, oral, Daily    fenofibrate (TRICOR) 48 mg, oral, Daily    flash glucose sensor kit (FreeStyle Danisha 14 Day Sensor) kit 1 each, subcutaneous, Every 14 days    fluticasone (Flonase) 50 mcg/actuation nasal spray 1 spray, Each Nostril, Daily    gabapentin (NEURONTIN) 300 mg, oral, See admin instructions, TAKE 1 CAPSULE in morning and afternoon, 2 capsules at bedtime= total 4 per day    HumaLOG KwikPen Insulin 100 unit/mL injection Inject 10-15 units sq with each meal. Pls alternate inj site. Max of 45 units per day.    insulin glargine-yfgn (Semglee,insulin glarg-yfgn,Pen) 100 unit/mL (3 mL) Pen Inject 50 units under the skin once daily    meclizine (ANTIVERT) 25 mg, oral, 3 times daily PRN    nitroglycerin (NITROSTAT) 0.4 mg, sublingual, Every 5 min PRN    pantoprazole (PROTONIX) 40 mg, oral, Daily, Do not crush, chew, or split.       Allergies  Allergies   Allergen Reactions    Iodine Rash    Metoprolol Other     Dizziness    Povidone-Iodine Rash       Social History  Social History     Tobacco Use    Smoking status: Former     Types: Cigarettes    Smokeless tobacco: Never   Vaping Use    Vaping status: Never Used   Substance Use Topics    Alcohol use: Not Currently    Drug use: Never       Family History  Family History   Problem Relation Name Age of Onset    Heart attack Father         VITALS  Vitals:    08/19/24 0859   BP: 130/72   Pulse: 70   SpO2: 100%       Weight  Vitals:    08/19/24 0859   Weight: 66.2 kg (145 lb 14.4 oz)       PHYSICAL EXAM  Physical Exam  Vitals and nursing note reviewed.   Constitutional:       General: She is not in acute distress.  HENT:      Head: Normocephalic and atraumatic.      Mouth/Throat:      Mouth: Mucous membranes are moist.      Pharynx: Oropharynx is clear.   Eyes:       General: No scleral icterus.     Pupils: Pupils are equal, round, and reactive to light.   Cardiovascular:      Rate and Rhythm: Normal rate and regular rhythm.      Pulses: Normal pulses.      Heart sounds: Normal heart sounds, S1 normal and S2 normal. No murmur heard.     No friction rub.   Pulmonary:      Effort: Pulmonary effort is normal.      Breath sounds: Normal breath sounds.   Abdominal:      General: Bowel sounds are normal. There is no distension.      Palpations: Abdomen is soft.      Tenderness: There is no abdominal tenderness.   Musculoskeletal:         General: No swelling. Normal range of motion.      Cervical back: Normal range of motion and neck supple.      Right lower leg: No edema.      Left lower leg: No edema.   Skin:     General: Skin is warm and dry.      Capillary Refill: Capillary refill takes less than 2 seconds.      Findings: No rash.   Neurological:      General: No focal deficit present.      Mental Status: She is alert.   Psychiatric:         Mood and Affect: Mood normal.         Behavior: Behavior normal.         Cardiovascular Labs  Lab Results   Component Value Date    HGB 13.3 04/17/2023    HGB 12.6 09/03/2022    HGB 13.1 09/01/2022     09/03/2022    WBC 6.0 09/03/2022     09/03/2022    K 3.5 09/03/2022    CREATININE 0.69 09/03/2022    CREATININE 0.82 09/01/2022    CREATININE 0.85 08/30/2022    BUN 8 09/03/2022    CALCIUM 9.1 09/03/2022    INR 0.9 09/01/2022    LDLF - 09/01/2022       Assessment and Plan  Her symptoms are consistent with stable ischemic coronary disease.  Since it has been almost 2 years since her PCI I will stop her Plavix and have her continue with just aspirin.  We have not gotten an LDL value on her for years due to her extreme hypertriglyceridemia so I will order a direct LDL.  If her LDL is not at goal we will submit prior authorization for PCSK9 inhibitor.  Her diabetes is poorly controlled with A1c of 8.3 so I recommend her to  follow-up with Dr. Galeana for management of her diabetes.  Her elevated blood pressure at the urgent care center may have been secondary to her acute pain however I did encourage her to obtain a blood pressure cuff and to check it at home to 3 times a week and to either call the office with the numbers or to present them to Dr. Galeana at her follow-up next month.      Return to Care:  1 year.  Will call with results of direct LDL.    If your symptoms worsen or progress please go directory to your nearest emergency department for evaluation.     Thank you for this interesting clinical case and allowing me to participate in the care of this patient. Please reach me out if you have any questions or if you need any clarifications regarding this patient's care.    **Disclaimer: This note was dictated by speech recognition, and every effort has been made to prevent any error in transcription, however minor errors may be present**  ___________________________________________________  Preston Luque, MSN, CNP, ACNPC, CCRN  Division of Cardiovascular Medicine  [facility]  [facility]

## 2024-08-16 ENCOUNTER — OFFICE VISIT (OUTPATIENT)
Dept: PRIMARY CARE | Facility: CLINIC | Age: 49
End: 2024-08-16
Payer: COMMERCIAL

## 2024-08-16 VITALS
HEART RATE: 84 BPM | SYSTOLIC BLOOD PRESSURE: 134 MMHG | BODY MASS INDEX: 23.45 KG/M2 | OXYGEN SATURATION: 98 % | WEIGHT: 145.9 LBS | DIASTOLIC BLOOD PRESSURE: 68 MMHG | HEIGHT: 66 IN

## 2024-08-16 DIAGNOSIS — Z86.39 HX OF HYPOGLYCEMIA: Primary | ICD-10-CM

## 2024-08-16 DIAGNOSIS — E10.42 DM TYPE 1 WITH DIABETIC PERIPHERAL NEUROPATHY (MULTI): ICD-10-CM

## 2024-08-16 DIAGNOSIS — L98.9 FACE LESION: ICD-10-CM

## 2024-08-16 PROCEDURE — 3052F HG A1C>EQUAL 8.0%<EQUAL 9.0%: CPT | Performed by: STUDENT IN AN ORGANIZED HEALTH CARE EDUCATION/TRAINING PROGRAM

## 2024-08-16 PROCEDURE — 1036F TOBACCO NON-USER: CPT | Performed by: STUDENT IN AN ORGANIZED HEALTH CARE EDUCATION/TRAINING PROGRAM

## 2024-08-16 PROCEDURE — 3075F SYST BP GE 130 - 139MM HG: CPT | Performed by: STUDENT IN AN ORGANIZED HEALTH CARE EDUCATION/TRAINING PROGRAM

## 2024-08-16 PROCEDURE — 99213 OFFICE O/P EST LOW 20 MIN: CPT | Performed by: STUDENT IN AN ORGANIZED HEALTH CARE EDUCATION/TRAINING PROGRAM

## 2024-08-16 PROCEDURE — 3008F BODY MASS INDEX DOCD: CPT | Performed by: STUDENT IN AN ORGANIZED HEALTH CARE EDUCATION/TRAINING PROGRAM

## 2024-08-16 PROCEDURE — 3078F DIAST BP <80 MM HG: CPT | Performed by: STUDENT IN AN ORGANIZED HEALTH CARE EDUCATION/TRAINING PROGRAM

## 2024-08-16 RX ORDER — BLOOD-GLUCOSE,RECEIVER,CONT
EACH MISCELLANEOUS
Qty: 1 EACH | Refills: 0 | Status: SHIPPED | OUTPATIENT
Start: 2024-08-16 | End: 2024-08-16 | Stop reason: ALTCHOICE

## 2024-08-16 RX ORDER — BLOOD-GLUCOSE SENSOR
EACH MISCELLANEOUS
Qty: 6 EACH | Refills: 3 | Status: SHIPPED | OUTPATIENT
Start: 2024-08-16

## 2024-08-16 RX ORDER — BLOOD-GLUCOSE SENSOR
EACH MISCELLANEOUS
Qty: 4 EACH | Refills: 1 | Status: SHIPPED | OUTPATIENT
Start: 2024-08-16 | End: 2024-08-16

## 2024-08-16 NOTE — PROGRESS NOTES
Subjective   Patient ID: Blanca Nye is a 49 y.o. female who presents for Mole on face .    HPI    PT is here today for a couple spots on her face that are burning and itching. Unsure since when she has had these lesions now - may be more than 6 months ago. Reports this has been an on and off thing. Denies trying lotions or creams for this. Reports the spot are on the left side below the cheek bone.   Appears benign lesions. If changing in character can consider derm referral. Spending a lot of time in sun without sunscreen. Encouraged her to use sun screen.     Has type 1 DM. Patient is currently using insulin.   Has hypoglycemic events intermittently.   Uses freestyle danisha first generation which does not provide alerts with hypoglycemia.   Ordering latest generation to get alerts with hypoglycemia.     Review of Systems  ROS negative except discussed above in HPI.    Vitals:    08/16/24 0749   BP: 134/68   Pulse: 84   SpO2: 98%     Objective   Physical Exam  Constitutional:       Appearance: Normal appearance.   Skin:     Comments: Small papular lesions noticed which are skin colored without pigmentation noticed on the cheeks.    Neurological:      Mental Status: She is alert.           Assessment/Plan   Blanca was seen today for mole on face.  Diagnoses and all orders for this visit:  Hx of hypoglycemia (Primary)  -     Discontinue: FreeStyle Danisha 3 Sensor device; Check blood glucose 3-4 times a day.  -     Discontinue: FreeStyle Danisha 3 [place] misc; Use as instructed  -     FreeStyle Danisha 3 Sensor device; Check blood glucose 3-4 times a day.  DM type 1 with diabetic peripheral neuropathy (Multi)  -     Discontinue: FreeStyle Danisha 3 Sensor device; Check blood glucose 3-4 times a day.  -     Discontinue: FreeStyle Danisha 3 [place] misc; Use as instructed  -     FreeStyle Danisha 3 Sensor device; Check blood glucose 3-4 times a day.  Face lesion      Follow up as needed.     Discussed with patient that I will be  leaving [facility] at the end of August. Discussed options to transfer care.            Gage Carrasquillo MD MPH

## 2024-08-19 ENCOUNTER — APPOINTMENT (OUTPATIENT)
Dept: CARDIOLOGY | Facility: CLINIC | Age: 49
End: 2024-08-19
Payer: COMMERCIAL

## 2024-08-19 ENCOUNTER — TELEPHONE (OUTPATIENT)
Dept: CARDIOLOGY | Facility: CLINIC | Age: 49
End: 2024-08-19

## 2024-08-19 VITALS
OXYGEN SATURATION: 100 % | HEIGHT: 66 IN | DIASTOLIC BLOOD PRESSURE: 72 MMHG | SYSTOLIC BLOOD PRESSURE: 130 MMHG | WEIGHT: 145.9 LBS | BODY MASS INDEX: 23.45 KG/M2 | HEART RATE: 70 BPM

## 2024-08-19 DIAGNOSIS — I25.118 CORONARY ARTERY DISEASE OF NATIVE ARTERY OF NATIVE HEART WITH STABLE ANGINA PECTORIS (CMS-HCC): Primary | ICD-10-CM

## 2024-08-19 DIAGNOSIS — E78.2 MIXED HYPERLIPIDEMIA: ICD-10-CM

## 2024-08-19 PROBLEM — I25.10 CORONARY ARTERY DISEASE INVOLVING NATIVE CORONARY ARTERY OF NATIVE HEART: Status: ACTIVE | Noted: 2024-08-19

## 2024-08-19 PROBLEM — I10 PRIMARY HYPERTENSION: Status: ACTIVE | Noted: 2024-08-19

## 2024-08-19 PROCEDURE — 3078F DIAST BP <80 MM HG: CPT

## 2024-08-19 PROCEDURE — 93000 ELECTROCARDIOGRAM COMPLETE: CPT

## 2024-08-19 PROCEDURE — 1036F TOBACCO NON-USER: CPT

## 2024-08-19 PROCEDURE — 3008F BODY MASS INDEX DOCD: CPT

## 2024-08-19 PROCEDURE — 99214 OFFICE O/P EST MOD 30 MIN: CPT

## 2024-08-19 PROCEDURE — 3075F SYST BP GE 130 - 139MM HG: CPT

## 2024-08-19 RX ORDER — ASPIRIN 81 MG/1
81 TABLET ORAL DAILY
Qty: 90 TABLET | Refills: 3 | Status: SHIPPED | OUTPATIENT
Start: 2024-08-19 | End: 2025-08-19

## 2024-08-19 NOTE — TELEPHONE ENCOUNTER
Plavix discontinued by Preston Luque d/t therapy completed and will continue with ASA 81mg one tab po daily.

## 2024-08-21 ENCOUNTER — TELEPHONE (OUTPATIENT)
Dept: PRIMARY CARE | Facility: CLINIC | Age: 49
End: 2024-08-21
Payer: COMMERCIAL

## 2024-08-21 DIAGNOSIS — E10.42 DM TYPE 1 WITH DIABETIC PERIPHERAL NEUROPATHY (MULTI): ICD-10-CM

## 2024-08-21 RX ORDER — GABAPENTIN 300 MG/1
300 CAPSULE ORAL SEE ADMIN INSTRUCTIONS
Qty: 360 CAPSULE | Refills: 3 | Status: SHIPPED | OUTPATIENT
Start: 2024-08-21 | End: 2025-08-21

## 2024-08-21 NOTE — TELEPHONE ENCOUNTER
gabapentin (Neurontin) 300 mg capsule [[id number]]    Order Details  Dose: 300 mg Route: oral Frequency: See admin instructions   Dispense Quantity: 8 capsule Refills: 0          Sig: Take 1 capsule (300 mg) by mouth see administration instructions. TAKE 1 CAPSULE in morning and afternoon, 2 capsules at bedtime= total 4 per day   300  TOTAL PILLS FOR 3 MTHS TO [facility]. THANK YOU.

## 2024-08-26 ENCOUNTER — APPOINTMENT (OUTPATIENT)
Dept: RADIOLOGY | Facility: HOSPITAL | Age: 49
End: 2024-08-26
Payer: COMMERCIAL

## 2024-08-26 DIAGNOSIS — Z12.31 SCREENING MAMMOGRAM FOR BREAST CANCER: ICD-10-CM

## 2024-09-04 ENCOUNTER — APPOINTMENT (OUTPATIENT)
Dept: PRIMARY CARE | Facility: CLINIC | Age: 49
End: 2024-09-04
Payer: COMMERCIAL

## 2024-09-16 DIAGNOSIS — E13.9 DIABETES 1.5, MANAGED AS TYPE 2 (MULTI): ICD-10-CM

## 2024-09-16 RX ORDER — FLASH GLUCOSE SENSOR
1 KIT MISCELLANEOUS
Qty: 6 EACH | Refills: 3 | Status: SHIPPED | OUTPATIENT
Start: 2024-09-16

## 2024-09-16 NOTE — TELEPHONE ENCOUNTER
Needs a new script for freestyle al 3 sensors    [facility]   [id number]  [place], [place]     Fax: [phone]    90 day supply

## 2024-09-16 NOTE — TELEPHONE ENCOUNTER
Needs a new script for freestyle al 3 sensors to [facility] for a 3 month supply faxed to [phone].

## 2024-10-08 ENCOUNTER — APPOINTMENT (OUTPATIENT)
Dept: RADIOLOGY | Facility: HOSPITAL | Age: 49
End: 2024-10-08
Payer: COMMERCIAL

## 2024-10-09 ENCOUNTER — LAB (OUTPATIENT)
Dept: LAB | Facility: LAB | Age: 49
End: 2024-10-09
Payer: COMMERCIAL

## 2024-10-09 DIAGNOSIS — E78.2 MIXED HYPERLIPIDEMIA: ICD-10-CM

## 2024-10-09 DIAGNOSIS — E10.42 DM TYPE 1 WITH DIABETIC PERIPHERAL NEUROPATHY: ICD-10-CM

## 2024-10-09 DIAGNOSIS — I25.118 CORONARY ARTERY DISEASE OF NATIVE ARTERY OF NATIVE HEART WITH STABLE ANGINA PECTORIS: ICD-10-CM

## 2024-10-09 DIAGNOSIS — E78.5 HYPERLIPIDEMIA, UNSPECIFIED HYPERLIPIDEMIA TYPE: ICD-10-CM

## 2024-10-09 LAB
CHOLEST SERPL-MCNC: 134 MG/DL (ref 0–199)
CHOLESTEROL/HDL RATIO: 3.4
CREAT UR-MCNC: 56.4 MG/DL (ref 20–320)
EST. AVERAGE GLUCOSE BLD GHB EST-MCNC: 186 MG/DL
HBA1C MFR BLD: 8.1 %
HDLC SERPL-MCNC: 40 MG/DL
LDLC SERPL CALC-MCNC: 61 MG/DL
LDLC SERPL DIRECT ASSAY-MCNC: 71 MG/DL (ref 0–129)
MICROALBUMIN UR-MCNC: <7 MG/L
MICROALBUMIN/CREAT UR: NORMAL MG/G{CREAT}
NON HDL CHOLESTEROL: 94 MG/DL (ref 0–149)
TRIGL SERPL-MCNC: 165 MG/DL (ref 0–149)
VLDL: 33 MG/DL (ref 0–40)

## 2024-10-09 PROCEDURE — 82570 ASSAY OF URINE CREATININE: CPT

## 2024-10-09 PROCEDURE — 80061 LIPID PANEL: CPT

## 2024-10-09 PROCEDURE — 83036 HEMOGLOBIN GLYCOSYLATED A1C: CPT

## 2024-10-09 PROCEDURE — 83721 ASSAY OF BLOOD LIPOPROTEIN: CPT

## 2024-10-09 PROCEDURE — 82043 UR ALBUMIN QUANTITATIVE: CPT

## 2024-10-09 PROCEDURE — 36415 COLL VENOUS BLD VENIPUNCTURE: CPT

## 2024-10-15 ENCOUNTER — APPOINTMENT (OUTPATIENT)
Dept: PRIMARY CARE | Facility: CLINIC | Age: 49
End: 2024-10-15
Payer: COMMERCIAL

## 2024-10-15 VITALS
BODY MASS INDEX: 23.77 KG/M2 | WEIGHT: 147.3 LBS | DIASTOLIC BLOOD PRESSURE: 70 MMHG | HEART RATE: 73 BPM | SYSTOLIC BLOOD PRESSURE: 150 MMHG | OXYGEN SATURATION: 100 %

## 2024-10-15 DIAGNOSIS — E10.42 DM TYPE 1 WITH DIABETIC PERIPHERAL NEUROPATHY: ICD-10-CM

## 2024-10-15 DIAGNOSIS — Z86.39 HX OF HYPOGLYCEMIA: ICD-10-CM

## 2024-10-15 DIAGNOSIS — R11.10 CHRONIC VOMITING: ICD-10-CM

## 2024-10-15 DIAGNOSIS — E13.9 DIABETES 1.5, MANAGED AS TYPE 2 (MULTI): Primary | ICD-10-CM

## 2024-10-15 PROCEDURE — 1036F TOBACCO NON-USER: CPT | Performed by: FAMILY MEDICINE

## 2024-10-15 PROCEDURE — 3052F HG A1C>EQUAL 8.0%<EQUAL 9.0%: CPT | Performed by: FAMILY MEDICINE

## 2024-10-15 PROCEDURE — 3078F DIAST BP <80 MM HG: CPT | Performed by: FAMILY MEDICINE

## 2024-10-15 PROCEDURE — 3048F LDL-C <100 MG/DL: CPT | Performed by: FAMILY MEDICINE

## 2024-10-15 PROCEDURE — 3062F POS MACROALBUMINURIA REV: CPT | Performed by: FAMILY MEDICINE

## 2024-10-15 PROCEDURE — 3077F SYST BP >= 140 MM HG: CPT | Performed by: FAMILY MEDICINE

## 2024-10-15 PROCEDURE — 99214 OFFICE O/P EST MOD 30 MIN: CPT | Performed by: FAMILY MEDICINE

## 2024-10-15 RX ORDER — FLASH GLUCOSE SENSOR
1 KIT MISCELLANEOUS
Qty: 6 EACH | Refills: 3 | Status: CANCELLED | OUTPATIENT
Start: 2024-10-15

## 2024-10-15 RX ORDER — BLOOD-GLUCOSE SENSOR
EACH MISCELLANEOUS
Qty: 6 EACH | Refills: 3 | Status: SHIPPED | OUTPATIENT
Start: 2024-10-15

## 2024-10-15 NOTE — PROGRESS NOTES
Subjective   Patient ID: Blanca Nye is a 49 y.o. female who presents for Diabetes (3 mo).    Diabetes       DM 1.5    A1c is now 8.1      However pt states is high due to noncompliance     Will have her track sugars on log sheet      In the last 90 days has had 12 low        Freestyle danisha   Semglee 45 night   Humalog tiadac      1 unit  per 30 mg over 120      6 units per 15 gm cho      BF was 180 2 units  today      Has been up to 600       States is not at goal but she forgets to take       Has low blood sugars            HTN normal at cardiology   H/o of N/V    No pyosis with the vomitting but does have occ pyrosis    ? Gastroparesis         Review of Systems    Objective   /70   Pulse 73   Wt 66.8 kg (147 lb 4.8 oz)   SpO2 100%   BMI 23.77 kg/m²     Physical Exam  Vitals reviewed.   Constitutional:       General: She is not in acute distress.     Appearance: Normal appearance.   HENT:      Head: Normocephalic and atraumatic.   Eyes:      Conjunctiva/sclera: Conjunctivae normal.   Cardiovascular:      Rate and Rhythm: Normal rate and regular rhythm.      Heart sounds: No murmur heard.  Pulmonary:      Effort: Pulmonary effort is normal.      Breath sounds: Normal breath sounds.   Abdominal:      General: Abdomen is flat. Bowel sounds are normal.      Palpations: Abdomen is soft. There is no mass.   Lymphadenopathy:      Cervical: No cervical adenopathy.   Skin:     General: Skin is warm and dry.   Neurological:      General: No focal deficit present.      Mental Status: She is alert and oriented to person, place, and time.   Psychiatric:         Mood and Affect: Mood normal.         Judgment: Judgment normal.         Assessment/Plan   Diagnoses and all orders for this visit:  Diabetes 1.5, managed as type 2 (Multi)  -     FreeStyle Danisha 3 Sensor device; Check blood glucose 3-4 times a day.  Hx of hypoglycemia  -     FreeStyle Danisha 3 Sensor device; Check blood glucose 3-4 times a day.  DM type 1  with diabetic peripheral neuropathy  -     FreeStyle Danisha 3 Sensor device; Check blood glucose 3-4 times a day.

## 2024-10-16 DIAGNOSIS — E13.9 DIABETES 1.5, MANAGED AS TYPE 2 (MULTI): ICD-10-CM

## 2024-10-16 NOTE — TELEPHONE ENCOUNTER
I need to get a prescription for Humalog Kwkpn sent into [facility]. Dr Childers sent in a prescription for it a few months ago but I think he wrote it wrong because I am unable to get refills for it. My prescriptions have to be written for 3 months supply at a time or else [facility] will not fill it.     Order pended

## 2024-10-17 DIAGNOSIS — E13.9 DIABETES 1.5, MANAGED AS TYPE 2 (MULTI): ICD-10-CM

## 2024-10-17 RX ORDER — INSULIN LISPRO 100 [IU]/ML
INJECTION, SOLUTION INTRAVENOUS; SUBCUTANEOUS
Qty: 15 ML | Refills: 2 | Status: SHIPPED | OUTPATIENT
Start: 2024-10-17 | End: 2024-10-18 | Stop reason: SDUPTHER

## 2024-10-18 RX ORDER — INSULIN LISPRO 100 [IU]/ML
INJECTION, SOLUTION INTRAVENOUS; SUBCUTANEOUS
Qty: 45 ML | Refills: 3 | Status: SHIPPED | OUTPATIENT
Start: 2024-10-18

## 2024-10-29 ENCOUNTER — HOSPITAL ENCOUNTER (OUTPATIENT)
Dept: RADIOLOGY | Facility: HOSPITAL | Age: 49
Discharge: HOME | End: 2024-10-29
Payer: COMMERCIAL

## 2024-10-29 DIAGNOSIS — E13.9 DIABETES 1.5, MANAGED AS TYPE 2 (MULTI): ICD-10-CM

## 2024-10-29 DIAGNOSIS — R11.10 CHRONIC VOMITING: ICD-10-CM

## 2024-10-29 PROCEDURE — 78264 GASTRIC EMPTYING IMG STUDY: CPT

## 2024-10-29 PROCEDURE — 78264 GASTRIC EMPTYING IMG STUDY: CPT | Performed by: STUDENT IN AN ORGANIZED HEALTH CARE EDUCATION/TRAINING PROGRAM

## 2024-10-29 PROCEDURE — 3430000001 HC RX 343 DIAGNOSTIC RADIOPHARMACEUTICALS: Performed by: FAMILY MEDICINE

## 2025-01-17 ENCOUNTER — APPOINTMENT (OUTPATIENT)
Dept: PRIMARY CARE | Facility: CLINIC | Age: 50
End: 2025-01-17
Payer: COMMERCIAL

## 2025-02-13 ENCOUNTER — TELEPHONE (OUTPATIENT)
Dept: PRIMARY CARE | Facility: CLINIC | Age: 50
End: 2025-02-13
Payer: COMMERCIAL

## 2025-02-13 DIAGNOSIS — E78.5 HYPERLIPIDEMIA, UNSPECIFIED HYPERLIPIDEMIA TYPE: ICD-10-CM

## 2025-02-13 DIAGNOSIS — F32.9 MAJOR DEPRESSIVE DISORDER WITH CURRENT ACTIVE EPISODE, UNSPECIFIED DEPRESSION EPISODE SEVERITY, UNSPECIFIED WHETHER RECURRENT: ICD-10-CM

## 2025-02-13 RX ORDER — BUSPIRONE HYDROCHLORIDE 5 MG/1
5 TABLET ORAL 3 TIMES DAILY
Qty: 270 TABLET | Refills: 1 | Status: SHIPPED | OUTPATIENT
Start: 2025-02-13

## 2025-02-13 RX ORDER — CITALOPRAM 40 MG/1
40 TABLET, FILM COATED ORAL DAILY
Qty: 90 TABLET | Refills: 1 | Status: SHIPPED | OUTPATIENT
Start: 2025-02-13

## 2025-02-13 RX ORDER — FENOFIBRATE 48 MG/1
48 TABLET, FILM COATED ORAL DAILY
Qty: 90 TABLET | Refills: 1 | Status: SHIPPED | OUTPATIENT
Start: 2025-02-13

## 2025-02-13 RX ORDER — ATORVASTATIN CALCIUM 80 MG/1
80 TABLET, FILM COATED ORAL DAILY
Qty: 90 TABLET | Refills: 1 | Status: SHIPPED | OUTPATIENT
Start: 2025-02-13

## 2025-02-13 NOTE — TELEPHONE ENCOUNTER
Requesting refills for Fenofibrate 48mg,  Buspirone 5mg,  Atorvastatin 80mg,  Citalopram 40mg    [facility]

## 2025-03-28 ENCOUNTER — OFFICE VISIT (OUTPATIENT)
Dept: PRIMARY CARE | Facility: CLINIC | Age: 50
End: 2025-03-28
Payer: COMMERCIAL

## 2025-03-28 VITALS
WEIGHT: 146.6 LBS | HEART RATE: 84 BPM | SYSTOLIC BLOOD PRESSURE: 150 MMHG | DIASTOLIC BLOOD PRESSURE: 74 MMHG | OXYGEN SATURATION: 97 % | BODY MASS INDEX: 23.66 KG/M2

## 2025-03-28 DIAGNOSIS — J01.10 ACUTE NON-RECURRENT FRONTAL SINUSITIS: Primary | ICD-10-CM

## 2025-03-28 DIAGNOSIS — E13.9 DIABETES 1.5, MANAGED AS TYPE 2 (MULTI): ICD-10-CM

## 2025-03-28 DIAGNOSIS — I10 PRIMARY HYPERTENSION: ICD-10-CM

## 2025-03-28 PROCEDURE — 99214 OFFICE O/P EST MOD 30 MIN: CPT | Performed by: FAMILY MEDICINE

## 2025-03-28 PROCEDURE — 3078F DIAST BP <80 MM HG: CPT | Performed by: FAMILY MEDICINE

## 2025-03-28 PROCEDURE — 3077F SYST BP >= 140 MM HG: CPT | Performed by: FAMILY MEDICINE

## 2025-03-28 RX ORDER — AMOXICILLIN 500 MG/1
1000 CAPSULE ORAL EVERY 12 HOURS SCHEDULED
Qty: 40 CAPSULE | Refills: 0 | Status: SHIPPED | OUTPATIENT
Start: 2025-03-28 | End: 2025-04-07

## 2025-03-28 NOTE — PROGRESS NOTES
Subjective   Patient ID: Blanca Nye is a 49 y.o. female who presents for URI (Has had for about 1 month; sore throat, sinus pressure, teeth hurt, nasal congestion/drainage and a dry cough).    HPI   Sinus pressure pain    No fever   Nasal dc green   Teeth hurt   Otc decnongestant     Mild cough no cp   No   SOB     Hypertension not well-controlled    Diabetes due for A1c in 1 month ordered.  Review of Systems    Objective   /74   Pulse 84   Wt 66.5 kg (146 lb 9.6 oz)   SpO2 97%   BMI 23.66 kg/m²     Physical Exam  Vitals reviewed.   Constitutional:       Appearance: Normal appearance.   HENT:      Head: Normocephalic and atraumatic.      Right Ear: Tympanic membrane, ear canal and external ear normal.      Left Ear: Tympanic membrane, ear canal and external ear normal.      Nose: No congestion or rhinorrhea.      Comments: Deviated nasal septum to the left     Mouth/Throat:      Pharynx: No oropharyngeal exudate or posterior oropharyngeal erythema.   Eyes:      Conjunctiva/sclera: Conjunctivae normal.   Cardiovascular:      Rate and Rhythm: Normal rate and regular rhythm.   Pulmonary:      Effort: Pulmonary effort is normal.      Breath sounds: Normal breath sounds.   Musculoskeletal:      Cervical back: Neck supple.   Skin:     General: Skin is warm and dry.   Neurological:      General: No focal deficit present.      Mental Status: She is alert and oriented to person, place, and time.   Psychiatric:         Mood and Affect: Mood normal.         Behavior: Behavior normal.         Thought Content: Thought content normal.         Judgment: Judgment normal.         Assessment/Plan   Diagnoses and all orders for this visit:  Acute non-recurrent frontal sinusitis  -     amoxicillin (Amoxil) 500 mg capsule; Take 2 capsules (1,000 mg) by mouth every 12 hours for 10 days.  Diabetes 1.5, managed as type 2 (Multi)  -     Basic Metabolic Panel; Future  -     Hemoglobin A1C; Future  Primary hypertension    I asked  patient to get a home blood pressure monitor check weekly see her back in 4 weeks last blood pressures have been high currently on no medication.

## 2025-04-28 ENCOUNTER — APPOINTMENT (OUTPATIENT)
Dept: PRIMARY CARE | Facility: CLINIC | Age: 50
End: 2025-04-28
Payer: COMMERCIAL

## 2025-05-13 DIAGNOSIS — D48.5 NEOPLASM OF UNCERTAIN BEHAVIOR OF SKIN: Primary | ICD-10-CM

## 2025-05-19 ENCOUNTER — APPOINTMENT (OUTPATIENT)
Dept: PRIMARY CARE | Facility: CLINIC | Age: 50
End: 2025-05-19
Payer: COMMERCIAL

## 2025-06-17 ENCOUNTER — PATIENT MESSAGE (OUTPATIENT)
Dept: PRIMARY CARE | Facility: CLINIC | Age: 50
End: 2025-06-17
Payer: COMMERCIAL

## 2025-06-17 DIAGNOSIS — E13.9 DIABETES 1.5, MANAGED AS TYPE 2 (MULTI): Primary | ICD-10-CM

## 2025-06-17 RX ORDER — BLOOD-GLUCOSE SENSOR
EACH MISCELLANEOUS
Qty: 6 EACH | Refills: 3 | Status: SHIPPED | OUTPATIENT
Start: 2025-06-17

## 2025-07-10 DIAGNOSIS — E13.9 DIABETES 1.5, MANAGED AS TYPE 2 (MULTI): ICD-10-CM

## 2025-07-10 RX ORDER — INSULIN GLARGINE-YFGN 100 [IU]/ML
INJECTION, SOLUTION SUBCUTANEOUS
Qty: 135 ML | Refills: 3 | Status: SHIPPED | OUTPATIENT
Start: 2025-07-10

## 2025-07-10 NOTE — TELEPHONE ENCOUNTER
I got a notification from [facility] saying they need a new prescription for the Semglee Insulin. I also got a message the next day from them saying the medication was unavailable and that I need to contact my Dr to prescribe an alternate medication. So I guess if we could start with a prescription for Semglee and see if it goes through. I will need this prescription sent to [facility] and they require the prescription to be for 1 year and have 90 day supply refills dispensed at a time. If you have any questions please let me know. Thank you so much.     Order pending

## 2025-07-16 DIAGNOSIS — E13.9 DIABETES 1.5, MANAGED AS TYPE 2 (MULTI): ICD-10-CM

## 2025-07-16 DIAGNOSIS — E13.9 DIABETES 1.5, MANAGED AS TYPE 2 (MULTI): Primary | ICD-10-CM

## 2025-07-16 RX ORDER — INSULIN GLARGINE-YFGN 100 [IU]/ML
50 INJECTION, SOLUTION SUBCUTANEOUS EVERY 24 HOURS
Qty: 45 ML | Refills: 3 | Status: SHIPPED | OUTPATIENT
Start: 2025-07-16 | End: 2026-07-16

## 2025-07-16 RX ORDER — INSULIN GLARGINE-YFGN 100 [IU]/ML
50 INJECTION, SOLUTION SUBCUTANEOUS EVERY 24 HOURS
Qty: 45 ML | Refills: 3 | Status: SHIPPED | OUTPATIENT
Start: 2025-07-16 | End: 2025-07-16 | Stop reason: SDUPTHER

## 2025-08-05 DIAGNOSIS — Z12.31 ENCOUNTER FOR SCREENING MAMMOGRAM FOR BREAST CANCER: ICD-10-CM

## 2025-08-18 ENCOUNTER — APPOINTMENT (OUTPATIENT)
Dept: CARDIOLOGY | Facility: CLINIC | Age: 50
End: 2025-08-18
Payer: COMMERCIAL

## 2025-08-20 DIAGNOSIS — F32.9 MAJOR DEPRESSIVE DISORDER WITH CURRENT ACTIVE EPISODE, UNSPECIFIED DEPRESSION EPISODE SEVERITY, UNSPECIFIED WHETHER RECURRENT: ICD-10-CM

## 2025-08-20 RX ORDER — CITALOPRAM 40 MG/1
40 TABLET ORAL DAILY
Qty: 90 TABLET | Refills: 1 | Status: SHIPPED | OUTPATIENT
Start: 2025-08-20

## 2025-08-26 ENCOUNTER — APPOINTMENT (OUTPATIENT)
Dept: CARDIOLOGY | Facility: CLINIC | Age: 50
End: 2025-08-26
Payer: COMMERCIAL

## 2025-09-11 ENCOUNTER — APPOINTMENT (OUTPATIENT)
Dept: CARDIOLOGY | Facility: CLINIC | Age: 50
End: 2025-09-11
Payer: COMMERCIAL